# Patient Record
Sex: FEMALE | Race: BLACK OR AFRICAN AMERICAN | NOT HISPANIC OR LATINO | Employment: FULL TIME | URBAN - METROPOLITAN AREA
[De-identification: names, ages, dates, MRNs, and addresses within clinical notes are randomized per-mention and may not be internally consistent; named-entity substitution may affect disease eponyms.]

---

## 2017-01-23 ENCOUNTER — OFFICE VISIT (OUTPATIENT)
Dept: SURGERY | Facility: CLINIC | Age: 57
End: 2017-01-23
Payer: COMMERCIAL

## 2017-01-23 VITALS
HEIGHT: 65 IN | HEART RATE: 67 BPM | BODY MASS INDEX: 33.49 KG/M2 | RESPIRATION RATE: 18 BRPM | OXYGEN SATURATION: 99 % | SYSTOLIC BLOOD PRESSURE: 130 MMHG | WEIGHT: 201 LBS | DIASTOLIC BLOOD PRESSURE: 80 MMHG

## 2017-01-23 DIAGNOSIS — L02.01 FACIAL ABSCESS: Primary | ICD-10-CM

## 2017-01-23 DIAGNOSIS — C18.7 ADENOCARCINOMA OF SIGMOID COLON: ICD-10-CM

## 2017-01-23 PROCEDURE — 1159F MED LIST DOCD IN RCRD: CPT | Mod: S$GLB,,, | Performed by: PHYSICIAN ASSISTANT

## 2017-01-23 PROCEDURE — 99214 OFFICE O/P EST MOD 30 MIN: CPT | Mod: S$GLB,,, | Performed by: PHYSICIAN ASSISTANT

## 2017-01-23 RX ORDER — SODIUM CHLORIDE 9 MG/ML
INJECTION, SOLUTION INTRAVENOUS CONTINUOUS
Status: CANCELLED | OUTPATIENT
Start: 2017-01-24

## 2017-01-23 NOTE — PROGRESS NOTES
"History & Physical    SUBJECTIVE:     History of Present Illness:  Patient is a 57 y.o. female presents with complaints of a large mass to the left face present x 3 days.  Patient advises in the past the area has had a "blackhead" that she has expresses debris from, however this time nothing came out and the area swelled up.  Patient also advised she had rubbed the area with an alcohol pad and woke up  to increased swelling.  The mass is now down to her jaw-line on the left.  The patient also advises she needs to have her Right Port-A-Cath removed.  It has not been used or flushed since 2015.  She was previously scheduled to have it removed and had to reschedule the procedure.  The patient also advised she was advised to have a hysterectomy due to having excessive bleeding from uterine fibroids.  I have reviewed patient's medical history, surgical history, social history, medications and allergies with her.    Chief Complaint   Patient presents with    Other     Port needs to be removed    Cyst     Onleft side of face       Review of patient's allergies indicates:   Allergen Reactions    Ciprofloxacin Itching and Swelling     Eye swelling       No current outpatient prescriptions on file.     No current facility-administered medications for this visit.        Past Medical History   Diagnosis Date    Adenocarcinoma of sigmoid colon     Fibroids      Uterine     Past Surgical History   Procedure Laterality Date     section      Colon surgery       Partial Colon Resection of Sigmoid with a segment of jejunum and ileum     Family History   Problem Relation Age of Onset    Hypertension Mother     Diabetes Mother     Hypertension Father     Cancer Sister     Cancer Maternal Aunt      Social History   Substance Use Topics    Smoking status: Never Smoker    Smokeless tobacco: Never Used    Alcohol use No        Review of Systems:  Review of Systems   Constitutional: Negative for " "appetite change, chills, diaphoresis, fatigue and fever.   HENT: Positive for facial swelling (left cheek/jaw-line). Negative for congestion, postnasal drip, rhinorrhea, sinus pressure, sneezing, sore throat and trouble swallowing.    Respiratory: Negative for cough, choking, shortness of breath, wheezing and stridor.    Cardiovascular: Negative for chest pain and palpitations.   Gastrointestinal: Negative for abdominal distention, abdominal pain, blood in stool, constipation, diarrhea, nausea and vomiting.   Musculoskeletal: Negative for arthralgias, back pain, joint swelling, myalgias, neck pain and neck stiffness.   Skin: Negative for color change, pallor, rash and wound.        Mass effect with redness and pain to left face/jaw-line  X 3 days   Neurological: Negative for dizziness, syncope, speech difficulty, weakness, light-headedness and headaches.   Psychiatric/Behavioral: Negative for agitation, confusion and hallucinations. The patient is not nervous/anxious and is not hyperactive.        OBJECTIVE:     Vital Signs (Most Recent)  Pulse: 67 (01/23/17 1343)  Resp: 18 (01/23/17 1343)  BP: 130/80 (01/23/17 1343)  SpO2: 99 % (01/23/17 1343)  5' 4.5" (1.638 m)  91.2 kg (201 lb)     Physical Exam:  Physical Exam   Constitutional: She is oriented to person, place, and time. She appears well-developed and well-nourished. No distress.   HENT:   Head: Normocephalic and atraumatic.   Right Ear: External ear normal.   Left Ear: External ear normal.   Nose: Nose normal.   Eyes: Conjunctivae and EOM are normal. Pupils are equal, round, and reactive to light. No scleral icterus.   Neck: Normal range of motion. Neck supple. No tracheal deviation present.   Cardiovascular: Normal rate, regular rhythm and normal heart sounds.  Exam reveals no gallop and no friction rub.    No murmur heard.  Pulmonary/Chest: Effort normal and breath sounds normal. No stridor. No respiratory distress. She has no wheezes. She has no rales. " "  Abdominal: Soft. Bowel sounds are normal. She exhibits no distension. There is no tenderness.   Musculoskeletal: Normal range of motion. She exhibits no edema, tenderness or deformity.   Neurological: She is alert and oriented to person, place, and time. Coordination normal.   Skin: Skin is warm and dry. No rash noted. She is not diaphoretic. There is erythema. No pallor.   "Golf ball" size mass to the left face, + induration, TTP, + erythema, no drainage noted.     Right Chest wall, port-a-cath inplace, no erythema, NTTP, no calor noted.   Psychiatric: She has a normal mood and affect. Her behavior is normal. Judgment and thought content normal.   Nursing note and vitals reviewed.      Laboratory  No recent CBC or BMP    Diagnostic Results:  No recent EKG    ASSESSMENT/PLAN:     1.  Large Abscess of Left Face/Jaw-line  2.  H/o Adenocarcinoma of Sigmoid Colon  3.  H/o Uterine Fibroids    PLAN:Plan     1.  I & D of Left Facial Abscess in the Am.  2.  Removal of Right Port-A-Cath in the am.  3.  Preoperative testing to include: CBC, BMP & EKG.  4.  Dr Magana advised of above.       "

## 2017-02-06 ENCOUNTER — OFFICE VISIT (OUTPATIENT)
Dept: SURGERY | Facility: CLINIC | Age: 57
End: 2017-02-06
Payer: COMMERCIAL

## 2017-02-06 VITALS
BODY MASS INDEX: 33.15 KG/M2 | HEIGHT: 65 IN | WEIGHT: 199 LBS | RESPIRATION RATE: 18 BRPM | OXYGEN SATURATION: 99 % | SYSTOLIC BLOOD PRESSURE: 122 MMHG | HEART RATE: 68 BPM | DIASTOLIC BLOOD PRESSURE: 70 MMHG

## 2017-02-06 DIAGNOSIS — Z98.890 POST-OPERATIVE STATE: Primary | ICD-10-CM

## 2017-02-06 PROCEDURE — 99024 POSTOP FOLLOW-UP VISIT: CPT | Mod: S$GLB,,, | Performed by: EMERGENCY MEDICINE

## 2017-02-06 NOTE — MR AVS SNAPSHOT
"    [facility]  [address]  [address] [address]  Phone: [phone]  Fax: [phone]                  Martha Baca   2017 2:30 PM   Office Visit    Description:  Female : 1960   Provider:  NICOLE Magana MD   Department:  [facility]           Reason for Visit     Follow-up           Diagnoses this Visit        Comments    Post-operative state    -  Primary            To Do List           Goals (5 Years of Data)     None      Follow-Up and Disposition     Return if symptoms worsen or fail to improve.      [facility]     [facility] Nurse Care Line -  Assistance  Registered nurses in the [facility] provide clinical advisement, health education, appointment booking, and other advisory services.  Call for this free service at [phone].             Medications           Message regarding Medications     Verify the changes and/or additions to your medication regime listed below are the same as discussed with your clinician today.  If any of these changes or additions are incorrect, please notify your healthcare provider.             Verify that the below list of medications is an accurate representation of the medications you are currently taking.  If none reported, the list may be blank. If incorrect, please contact your healthcare provider. Carry this list with you in case of emergency.           Current Medications     ondansetron (ZOFRAN-ODT) 4 MG TbDL Take 2 tablets (8 mg total) by mouth 3 (three) times daily as needed (nausea).           Clinical Reference Information           Your Vitals Were     BP Pulse Resp Height Weight Last Period    122/70 (BP Location: Right arm, Patient Position: Sitting, BP Method: Manual) 68 18 5' 4.5" (1.638 m) 90.3 kg (199 lb) (LMP Unknown)    SpO2 BMI             99% 33.63 kg/m2         Blood Pressure          Most Recent Value    BP  122/70      Allergies as of " 2/6/2017     Ciprofloxacin      Immunizations Administered on Date of Encounter - 2/6/2017     None      [facility] Sign-Up     Activating your [facility] account is as easy as 1-2-3!     1) Visit [email or web address], select Sign Up Now, enter this activation code and your date of birth, then select Next.  [email or web address]  Expires: 3/9/2017  3:13 PM      2) Create a username and password to use when you visit [facility] in the future and select a security question in case you lose your password and select Next.    3) Enter your e-mail address and click Sign Up!    Additional Information  If you have questions, please e-mail [email or web address] or call [phone] to talk to our [facility] staff. Remember, [facility] is NOT to be used for urgent needs. For medical emergencies, dial 911.         Language Assistance Services     ATTENTION: Language assistance services are available, free of charge. Please call [phone].      ATENCIÓN: Si habla español, tiene a rick disposición servicios gratuitos de asistencia lingüística. Llame al [phone].     PATTI Ý: N?u b?n nói Ti?ng Vi?t, có các d?ch v? h? tr? ngôn ng? mi?n phí dành cho b?n. G?i s? [phone].         [facility] complies with applicable Federal civil rights laws and does not discriminate on the basis of race, color, national origin, age, disability, or sex.

## 2017-02-06 NOTE — PROGRESS NOTES
Area has healed very nicely    Pathology: epidermal inclusion cyst    Wound care instructions reviewed

## 2017-02-10 ENCOUNTER — OFFICE VISIT (OUTPATIENT)
Dept: INTERNAL MEDICINE | Facility: CLINIC | Age: 57
End: 2017-02-10
Payer: COMMERCIAL

## 2017-02-10 VITALS
WEIGHT: 202.63 LBS | TEMPERATURE: 99 F | SYSTOLIC BLOOD PRESSURE: 110 MMHG | HEART RATE: 74 BPM | HEIGHT: 64 IN | BODY MASS INDEX: 34.59 KG/M2 | OXYGEN SATURATION: 98 % | RESPIRATION RATE: 16 BRPM | DIASTOLIC BLOOD PRESSURE: 65 MMHG

## 2017-02-10 DIAGNOSIS — Z00.00 ENCOUNTER FOR GENERAL HEALTH EXAMINATION: Primary | ICD-10-CM

## 2017-02-10 DIAGNOSIS — N85.8 FIBROSIS OF UTERUS: ICD-10-CM

## 2017-02-10 PROCEDURE — 99203 OFFICE O/P NEW LOW 30 MIN: CPT | Mod: S$GLB,,, | Performed by: INTERNAL MEDICINE

## 2017-02-10 NOTE — PROGRESS NOTES
"Subjective:      Patient ID: Martha Baca is a 57 y.o. female.    Chief Complaint: Establish Care    HPI: 57y/oAAF, just finished chemotherapy for adenocarcinoma of the sigmoid.  She had a resection, and then further therapy by Dr. Jean Ortega. Finished  Nov. 15,2016. Sees him every 3 months.  Now needs a partial or total hysterectomy for fibroids.  SH: never smoked.         Allergic to cipro ( rash).         . 2 children. . No pets.  FH:Father  in sleep, 50's MI. Mother with HTN.        1 sister  of unknown cancer. 6 other siblings.  Review of Systems   Constitutional: Negative.    HENT: Negative.    Eyes: Negative.    Respiratory: Negative.    Cardiovascular: Negative.    Gastrointestinal: Negative.    Genitourinary: Negative.         Last mammogram about .  Tubal pregnancy.  Other tube was tied.  Spots on occasion.   Musculoskeletal: Negative.    Skin: Negative.    Allergic/Immunologic: Negative.    Neurological: Negative.    Hematological: Negative.    Psychiatric/Behavioral: Negative.        Objective:     Visit Vitals    /65 (BP Location: Left arm, Patient Position: Sitting, BP Method: Manual)    Pulse 74    Temp 98.6 °F (37 °C) (Oral)    Resp 16    Ht 5' 4" (1.626 m)    Wt 91.9 kg (202 lb 9.6 oz)    LMP  (LMP Unknown)    SpO2 98%    BMI 34.78 kg/m2       Physical Exam   Constitutional: She is oriented to person, place, and time. She appears well-developed and well-nourished. No distress.   HENT:   Head: Normocephalic and atraumatic.   Right Ear: External ear normal.   Left Ear: External ear normal.   Nose: Nose normal.   Mouth/Throat: Oropharynx is clear and moist.   Eyes: Conjunctivae and EOM are normal. Pupils are equal, round, and reactive to light.   Neck: Normal range of motion. Neck supple.   Cardiovascular: Normal rate, regular rhythm and normal heart sounds.    Pulmonary/Chest: Effort normal and breath sounds normal.   Abdominal: Soft. Bowel " sounds are normal.   Musculoskeletal: Normal range of motion.   Neurological: She is alert and oriented to person, place, and time. She has normal reflexes.   Skin: Skin is warm and dry. She is not diaphoretic.   Right port a cath scar   Psychiatric: She has a normal mood and affect. Her behavior is normal. Judgment and thought content normal.   Nursing note and vitals reviewed.      Assessment:     1. Encounter for general health examination    2. Fibrosis of uterus    s/p surgery, chemo for adenocarcinoma of colon.  Plan:     Encounter for general health examination  -     CBC auto differential; Future; Expected date: 2/10/17  -     Comprehensive metabolic panel; Future; Expected date: 2/10/17  -     Lipid panel; Future; Expected date: 2/10/17  -     Urinalysis; Future; Expected date: 2/10/17    Fibrosis of uterus

## 2017-02-10 NOTE — MR AVS SNAPSHOT
"    [facility]  [address],  [address]  [place] [address]  Phone: [phone]  Fax: [phone]                  Martha Baca   2/10/2017 2:20 PM   Office Visit    Description:  Female : 1960   Provider:  Brian Cartagena MD   Department:  [facility]           Reason for Visit     Establish Care           Diagnoses this Visit        Comments    Encounter for general health examination    -  Primary            To Do List           Goals (5 Years of Data)     None      [facility]     [facility] Care [facility] -  Assistance  Registered nurses in the [facility] Center provide clinical advisement, health education, appointment booking, and other advisory services.  Call for this free service at [phone].             Medications           Message regarding Medications     Verify the changes and/or additions to your medication regime listed below are the same as discussed with your clinician today.  If any of these changes or additions are incorrect, please notify your healthcare provider.        STOP taking these medications     ondansetron (ZOFRAN-ODT) 4 MG TbDL Take 2 tablets (8 mg total) by mouth 3 (three) times daily as needed (nausea).           Verify that the below list of medications is an accurate representation of the medications you are currently taking.  If none reported, the list may be blank. If incorrect, please contact your healthcare provider. Carry this list with you in case of emergency.           Current Medications            Clinical Reference Information           Your Vitals Were     BP Pulse Temp Resp Height Weight    110/65 (BP Location: Left arm, Patient Position: Sitting, BP Method: Manual) 74 98.6 °F (37 °C) (Oral) 16 5' 4" (1.626 m) 91.9 kg (202 lb 9.6 oz)    Last Period SpO2 BMI          (LMP Unknown) 98% 34.78 kg/m2        Blood Pressure          Most Recent Value    BP  110/65    "   Allergies as of 2/10/2017     Ciprofloxacin      Immunizations Administered on Date of Encounter - 2/10/2017     None      Orders Placed During Today's Visit     Future Labs/Procedures Expected by Expires    CBC auto differential  2/10/2017 2/10/2018    Comprehensive metabolic panel  2/10/2017 2/10/2018    Lipid panel  2/10/2017 2/10/2018    Urinalysis  2/10/2017 2/10/2018      [facility] Sign-Up     Activating your [facility] account is as easy as 1-2-3!     1) Visit [email or web address], select Sign Up Now, enter this activation code and your date of birth, then select Next.  [email or web address]  Expires: 3/9/2017  3:13 PM      2) Create a username and password to use when you visit [facility] in the future and select a security question in case you lose your password and select Next.    3) Enter your e-mail address and click Sign Up!    Additional Information  If you have questions, please e-mail [email or web address] or call [phone] to talk to our [facility] staff. Remember, [facility] is NOT to be used for urgent needs. For medical emergencies, dial 911.         Language Assistance Services     ATTENTION: Language assistance services are available, free of charge. Please call [phone].      ATENCIÓN: Si habla español, tiene a rick disposición servicios gratuitos de asistencia lingüística. Llame al [phone].     CHÚ Ý: N?u b?n nói Ti?ng Vi?t, có các d?ch v? h? tr? ngôn ng? mi?n phí dành cho b?n. G?i s? [phone].         [facility] complies with applicable Federal civil rights laws and does not discriminate on the basis of race, color, national origin, age, disability, or sex.

## 2017-04-24 ENCOUNTER — PATIENT OUTREACH (OUTPATIENT)
Dept: ADMINISTRATIVE | Facility: HOSPITAL | Age: 57
End: 2017-04-24
Payer: COMMERCIAL

## 2017-04-24 NOTE — PATIENT INSTRUCTIONS
Mailed patient a letter regarding overdue health maintenance and their need for an upcoming office visit with the pcp

## 2017-05-26 DIAGNOSIS — Z12.31 OTHER SCREENING MAMMOGRAM: ICD-10-CM

## 2017-12-06 ENCOUNTER — PATIENT OUTREACH (OUTPATIENT)
Dept: ADMINISTRATIVE | Facility: HOSPITAL | Age: 57
End: 2017-12-06

## 2017-12-06 NOTE — PATIENT INSTRUCTIONS
Contacted pt regarding cervical cancer screening. Advised is past due, and to contact her GYN or PCP for scheduling

## 2018-08-02 DIAGNOSIS — Z12.39 BREAST CANCER SCREENING: ICD-10-CM

## 2018-08-02 DIAGNOSIS — Z11.59 NEED FOR HEPATITIS C SCREENING TEST: ICD-10-CM

## 2020-01-13 ENCOUNTER — PATIENT OUTREACH (OUTPATIENT)
Dept: ADMINISTRATIVE | Facility: HOSPITAL | Age: 60
End: 2020-01-13

## 2021-05-21 NOTE — PATIENT INSTRUCTIONS
1.  Nothing to eat or drink after midnight tonight  2.  Report to the Surgery Department @ 6:30 am Tuesday 1/24/2017  3.  Call our office with any questions/concerns   rest

## 2024-07-21 ENCOUNTER — HOSPITAL ENCOUNTER (EMERGENCY)
Facility: HOSPITAL | Age: 64
Discharge: HOME | End: 2024-07-21
Attending: EMERGENCY MEDICINE
Payer: MEDICARE

## 2024-07-21 VITALS
SYSTOLIC BLOOD PRESSURE: 180 MMHG | HEIGHT: 65 IN | DIASTOLIC BLOOD PRESSURE: 98 MMHG | RESPIRATION RATE: 16 BRPM | WEIGHT: 182 LBS | HEART RATE: 82 BPM | BODY MASS INDEX: 30.32 KG/M2 | TEMPERATURE: 99 F | OXYGEN SATURATION: 97 %

## 2024-07-21 DIAGNOSIS — U07.1 COVID-19 VIRUS DETECTED: ICD-10-CM

## 2024-07-21 DIAGNOSIS — U07.1 COVID-19: Primary | ICD-10-CM

## 2024-07-21 LAB
CTP QC/QA: YES
POC RAPID STREP A: NEGATIVE
SARS-COV-2 RDRP RESP QL NAA+PROBE: POSITIVE

## 2024-07-21 PROCEDURE — 87880 STREP A ASSAY W/OPTIC: CPT | Mod: ER

## 2024-07-21 PROCEDURE — 99283 EMERGENCY DEPT VISIT LOW MDM: CPT | Mod: ER

## 2024-07-21 PROCEDURE — 87635 SARS-COV-2 COVID-19 AMP PRB: CPT | Mod: ER | Performed by: EMERGENCY MEDICINE

## 2024-07-21 NOTE — ED NOTES
Patient presents to ED reports sore throat since Friday with minimal cough. Patient states began to feel worse Saturday. Pt started taking OTC medication, does not feel that it has helped, but does not feel worse since taking. Patient reports coworkers recently dx with COVID.

## 2024-07-21 NOTE — Clinical Note
"Martha"Shin Baca was seen and treated in our emergency department on 7/21/2024.     COVID-19 is present in our communities across the state. There is limited testing for COVID at this time, so not all patients can be tested. In this situation, your employee meets the following criteria:    Martha Baca has met the criteria for COVID-19 testing and has a POSITIVE result. She can return to work once they are asymptomatic for 24 hours without the use of fever reducing medications AND at least five days from the first positive result. A mask is recommended for 5 days post quarantine.     If you have any questions or concerns, or if I can be of further assistance, please do not hesitate to contact me.    Sincerely,             Nir Purdy MD"

## 2024-07-21 NOTE — ED PROVIDER NOTES
Encounter Date: 2024    SCRIBE #1 NOTE: IDottie, am scribing for, and in the presence of,  Nir Purdy MD. I have scribed the following portions of the note - Other sections scribed: HPI, ROS, PE.       History     Chief Complaint   Patient presents with    Sore Throat     Pt reports sore throat, cough x2 days     64-year-old female with a PMHx of HTN presents to the ED for a chief complaint of cough with associated sore throat. Patient notes a change in her voice. Able to tolerate PO. Endorses Nyquil, but notes it makes her bp high. Reports known sick contacts as her coworkers.  Denies fever, nausea, vomiting, diarrhea, headache, chest pain, dyspnea, urinary symptoms or other complaints.     The history is provided by the patient. No  was used.     Review of patient's allergies indicates:   Allergen Reactions    Ciprofloxacin Itching and Swelling     Eye swelling     Past Medical History:   Diagnosis Date    Adenocarcinoma of sigmoid colon     Colon cancer     Fibroids     Uterine     Past Surgical History:   Procedure Laterality Date     SECTION      colon cancer      COLON SURGERY      Partial Colon Resection of Sigmoid with a segment of jejunum and ileum    TUBAL LIGATION       Family History   Problem Relation Name Age of Onset    Hypertension Mother      Diabetes Mother      Hypertension Father      Cancer Sister 1     Cancer Maternal Aunt       Social History     Tobacco Use    Smoking status: Never    Smokeless tobacco: Never   Substance Use Topics    Alcohol use: No    Drug use: No     Review of Systems   Constitutional:  Negative for fever.   HENT:  Positive for sore throat and voice change. Negative for trouble swallowing.    Eyes:  Negative for visual disturbance.   Respiratory:  Positive for cough. Negative for shortness of breath.    Cardiovascular:  Negative for chest pain.   Gastrointestinal:  Negative for abdominal pain.   Genitourinary:   Negative for difficulty urinating and dysuria.   Musculoskeletal:  Negative for back pain.   Skin:  Negative for rash.   Neurological:  Negative for headaches.       Physical Exam     Initial Vitals   BP Pulse Resp Temp SpO2   07/21/24 0904 07/21/24 0904 07/21/24 0904 07/21/24 0906 07/21/24 0904   (!) 180/98 82 16 98.5 °F (36.9 °C) 97 %      MAP       --                Physical Exam    Nursing note and vitals reviewed.  Constitutional: She appears well-developed and well-nourished.   HENT:   Head: Normocephalic and atraumatic.   Pharyngeal erythema with no exudate. No lymphadenopathy.    Eyes: EOM are normal. Pupils are equal, round, and reactive to light.   Neck: Neck supple. No thyromegaly present. No JVD present.   Normal range of motion.  Cardiovascular:  Normal rate and regular rhythm.     Exam reveals no gallop and no friction rub.       No murmur heard.  Pulmonary/Chest: Breath sounds normal. No respiratory distress. She has no wheezes.   Lungs clear.    Abdominal: Abdomen is soft. Bowel sounds are normal. There is no abdominal tenderness.   Musculoskeletal:         General: No tenderness or edema. Normal range of motion.      Cervical back: Normal range of motion and neck supple.     Neurological: She is alert and oriented to person, place, and time. She has normal strength. GCS score is 15. GCS eye subscore is 4. GCS verbal subscore is 5. GCS motor subscore is 6.   Skin: Skin is warm and dry. Capillary refill takes less than 2 seconds.   Psychiatric: She has a normal mood and affect.         ED Course   Procedures  Labs Reviewed   SARS-COV-2 RDRP GENE - Abnormal       Result Value    POC Rapid COVID Positive (*)      Acceptable Yes      Narrative:     This test utilizes isothermal nucleic acid amplification technology to detect the SARS-CoV-2 RdRp nucleic acid segment. The analytical sensitivity (limit of detection) is 500 copies/swab.     A POSITIVE result is indicative of the presence of  SARS-CoV-2 RNA; clinical correlation with patient history and other diagnostic information is necessary to determine patient infection status.    A NEGATIVE result means that SARS-CoV-2 nucleic acids are not present above the limit of detection. A NEGATIVE result should be treated as presumptive. It does not rule out the possibility of COVID-19 and should not be the sole basis for treatment decisions. If COVID-19 is strongly suspected based on clinical and exposure history, re-testing using an alternate molecular assay should be considered.     Commercial kits are provided by [employer].   _________________________________________________________________   The authorized Fact Sheet for Healthcare Providers and the authorized Fact Sheet for Patients of the ID NOW COVID-19 are available on the FDA website:    https://www.fda.gov/media/271988/download      https://www.fda.gov/media/177326/download      POCT STREP A MOLECULAR   POCT STREP A, RAPID    POC Rapid Strep A negative            Imaging Results    None          Medications - No data to display  Medical Decision Making  This is an emergent evaluation of a 64 y.o. female who presents with cough and sore throat. The patient was seen and examined. The history and physical exam was obtained. The nursing notes and vital signs were reviewed. Secondary to symptoms and examination findings, I ordered POCT COVID-19 and Strep A rapid testing.       Amount and/or Complexity of Data Reviewed  Labs: ordered. Decision-making details documented in ED Course.    Risk  Prescription drug management.    COVID positive vital signs stable moderate elevated blood pressure follow up with primary care for blood pressure rechecked.  No chest pain.  No hypoxia.  Patient requesting Paxlovid after discussion.  No known contraindications.        Scribe Attestation:   Scribe #1: I performed the above scribed service and the documentation accurately describes the services I performed.  I attest to the accuracy of the note.                               Clinical Impression:  Final diagnoses:  [U07.1] COVID-19 (Primary)          ED Disposition Condition    Discharge Stable          ED Prescriptions       Medication Sig Dispense Start Date End Date Auth. Provider    nirmatrelvir-ritonavir 300 mg (150 mg x 2)-100 mg copackaged tablets (EUA) Take 3 tablets by mouth 2 (two) times daily for 5 days. Each dose contains 2 nirmatrelvir (pink tablets) and 1 ritonavir (white tablet). Take all 3 tablets together 30 tablet 7/21/2024 7/26/2024 Nir Purdy MD          Follow-up Information       Follow up With Specialties Details Why Contact Info    Jorge Crow MD Family Medicine   [address] [place] [address] [id number] [phone]            I, Nir Purdy, personally performed the services described in this documentation. All medical record entries made by the scribe were at my direction and in my presence. I have reviewed the chart and agree that the record reflects my personal performance and is accurate and complete.       Nir Purdy MD  07/21/24 4722